# Patient Record
Sex: FEMALE | Race: WHITE | Employment: STUDENT | URBAN - METROPOLITAN AREA
[De-identification: names, ages, dates, MRNs, and addresses within clinical notes are randomized per-mention and may not be internally consistent; named-entity substitution may affect disease eponyms.]

---
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
## 2025-03-14 ENCOUNTER — HOSPITAL ENCOUNTER (OUTPATIENT)
Facility: HOSPITAL | Age: 20
Setting detail: RECURRING SERIES
Discharge: HOME | End: 2025-03-17
Payer: MEDICAID

## 2025-03-14 PROCEDURE — 97161 PT EVAL LOW COMPLEX 20 MIN: CPT

## 2025-03-14 NOTE — PROGRESS NOTES
([place]):   Type 3 - Sausage Shape with Cracks in the Surface     Diet:   breakfast - cereal, toast, miguel, eggs  Lunch - chips   Dinner - meat, chicken janak, vegetable, stir-mills     Fluid intake:  Fluid  Amount per day   Water 1-2 glasses   Caffeine 0   Alcohol 0   Other 1-2 glasses ruby punch      Weight: 126 lb - ideally should be consuming 63 oz of fluids per day, 44.1 oz of water     Physical Exam Objective Findings:    Hip ROM Left AROM (degrees) Right AROM (degrees)   Flexion (norm 120 degrees) wnl wnl   Extension (norm 20 degrees) nt nt   Abduction (norm 45 degrees) nt nt   Adduction (norm 45 degrees) nt nt   External Rotation (norm 45 degrees) wnl wnl   Internal Rotation (norm 30 degrees) nt nt     Flexibility:  Stephan: [] R    [] L    [] +    [] -     Ely's:  [] R    [] L    [] +    [] -     Christy's:  [] R    [] L    [] +    [] -                           Piriformis:   [x] R    [x] L    [x] +    [] -     Hamstring: [x] R    [x] L    [x] +    [] -     Gastroc:   [] R    [] L    [] +    [] -     Sacroilliac:  Compression: [] +    [] -     Gapping:  [] +    [] -     Hip:  Basia:  [] R    [] L    [] +    [] -     FADIR:  [] R    [] L    [] +    [] -                          Scour:   [] R    [] L    [] +    [] -     Breathing: good DPB noted    20 min [x]Eval - untimed                             Therapeutic Procedures:  Tx Min Billable or 1:1 Min (if diff from Tx Min) Procedure, Rationale, Specifics   4 [phone] [id number] Therapeutic Exercise (timed):  increase ROM, strength, coordination, balance, and proprioception to improve patient's ability to progress to PLOF and address remaining functional goals. (see flow sheet as applicable)     Details if applicable:     6   10482 Self Care/Home Management (timed):  improve patient knowledge and understanding of see below  to improve patient's ability to progress to PLOF and address remaining functional goals.  (see flow sheet as applicable)      Details if applicable:  
decrease bladder pain.   Status at eval: Patient educated on stretching and breathing techniques today     Long term goals: To be achieved in 4 weeks:  Patient will demonstrate pelvic floor muscle resting tone <2 uV in supine, <3 uV in sitting and <5 uV in standing to help with bladder pain after voiding  Status at eval: BFB to be administered next session     Patient will demonstrate improvement of current complaints evidenced by a 7 point improvement in ATIYA  Status at Eval: ATIYA 17     Patient will demonstrate independence with management tools and exercise program that are beneficial for current condition in order to feel comfortable with Pelvic floor PT D/C and not fear exacerbation of current condition or symptoms returning.   Status at eval : patient fearful of return to exercise and unaware of what activities to avoid to avoid exacerbation of current condition    Frequency / Duration: Patient to be seen 2 times per week for 4 weeks    Patient/ Caregiver education and instruction: Diagnosis, prognosis, Proper Voiding Habits, Diet, Pain Management, Exercises, and Bladder Retraining    Echo Mao, PT 3/14/2025 9:16 AM    ________________________________________________________________________    I certify that the above Therapy Services are being furnished while the patient is under my care. I agree with the treatment plan and certify that this therapy is necessary.    Physician's Signature:____________Date:_________TIME:________     Kishore Duggan APRN - *  ** Signature, Date and Time must be completed for valid certification **      Please sign and return to [facility]  [address]. [place] [address]  Ph [phone]  Fx [phone]

## 2025-03-21 ENCOUNTER — HOSPITAL ENCOUNTER (OUTPATIENT)
Facility: HOSPITAL | Age: 20
Setting detail: RECURRING SERIES
End: 2025-03-21
Payer: MEDICAID

## 2025-04-01 ENCOUNTER — HOSPITAL ENCOUNTER (OUTPATIENT)
Facility: HOSPITAL | Age: 20
Setting detail: RECURRING SERIES
Discharge: HOME | End: 2025-04-04
Payer: MEDICAID

## 2025-04-01 PROCEDURE — 97535 SELF CARE MNGMENT TRAINING: CPT

## 2025-04-01 PROCEDURE — 97530 THERAPEUTIC ACTIVITIES: CPT

## 2025-04-01 PROCEDURE — 97110 THERAPEUTIC EXERCISES: CPT

## 2025-04-01 NOTE — PROGRESS NOTES
PHYSICAL / OCCUPATIONAL THERAPY - DAILY TREATMENT NOTE (updated )    Patient Name: Salud Martinez    Date: 2025    : 2005  Ins[facility] / Plan: [facility] CARDINAL CARE / Product Type: *No Product type* /      Patient  verified Yes     Visit #   Current / Total 1 8   Time   In / Out 8:05 8:40   Pain   In / Out 0 1-2   Subjective Functional Status/Changes: She reports that she isn't straining as bad as she was before with bowel movements. Pt reports the bladder pain has been still hurting a little bit. Pt reports she has been drinking 2 glasses per day.      TREATMENT AREA =  Feeling of incomplete bladder emptying  Other symptoms and signs involving the genitourinary system    OBJECTIVE         Therapeutic Procedures:    Tx Min Billable or 1:1 Min (if diff from Tx Min) Procedure, Rationale, Specifics          Details if applicable:              Details if applicable:     8  18143 Therapeutic Activity (timed):  use of dynamic activities replicating functional movements to increase ROM, strength, coordination, balance, and proprioception in order to improve patient's ability to progress to PLOF and address remaining functional goals.  (see flow sheet as applicable)     Details if applicable:  visceral mobilization, testing for hip flexibility    19  17347 Therapeutic Exercise (timed):  increase ROM, strength, coordination, balance, and proprioception to improve patient's ability to progress to PLOF and address remaining functional goals. (see flow sheet as applicable)     Details if applicable:     8  18834 Self Care/Home Management (timed):  improve patient knowledge and understanding of see below  to improve patient's ability to progress to PLOF and address remaining functional goals.  (see flow sheet as applicable)     Details if applicable:  pt edu on things she can use until ordering squatty potty and water intake (try to get 3 glasses per day this week), edu on

## 2025-04-04 ENCOUNTER — HOSPITAL ENCOUNTER (OUTPATIENT)
Facility: HOSPITAL | Age: 20
Setting detail: RECURRING SERIES
Discharge: HOME | End: 2025-04-07
Payer: MEDICAID

## 2025-04-04 PROCEDURE — 97530 THERAPEUTIC ACTIVITIES: CPT

## 2025-04-04 PROCEDURE — 97112 NEUROMUSCULAR REEDUCATION: CPT

## 2025-04-04 PROCEDURE — 97110 THERAPEUTIC EXERCISES: CPT

## 2025-04-04 PROCEDURE — 97535 SELF CARE MNGMENT TRAINING: CPT

## 2025-04-04 NOTE — PROGRESS NOTES
Provider Department [place]   4/4/2025  8:00 [facility]   4/7/2025  8:00 AM [facility]   4/8/2025  8:00 [facility]       If an interpreting service was utilized for treatment of this patient, the contents of this document represent the material reviewed with the patient via the .

## 2025-04-07 ENCOUNTER — HOSPITAL ENCOUNTER (OUTPATIENT)
Facility: HOSPITAL | Age: 20
Setting detail: RECURRING SERIES
Discharge: HOME | End: 2025-04-10
Payer: MEDICAID

## 2025-04-07 PROCEDURE — 97110 THERAPEUTIC EXERCISES: CPT

## 2025-04-07 PROCEDURE — 97530 THERAPEUTIC ACTIVITIES: CPT

## 2025-04-07 PROCEDURE — 97112 NEUROMUSCULAR REEDUCATION: CPT

## 2025-04-07 NOTE — PROGRESS NOTES
= 2 units; 38-52 min = 3 units; 53-67 min = 4 units; 68-82 min = 5 units   Total Total     charge capture    [x]  Patient Education billed concurrently with other procedures   [x] Review HEP    [] Progressed/Changed HEP, detail:    [] Other detail:       Objective Information/Functional Measures/Assessment    sEMG Biofeedback Results:    Supine:   2s on/4s off   Net strength: 3 uV  10s on/10s off   Net strength: 2 uV    Pt presents with good supine resting tone but weakness, was given pelvic floor exercises for HEP.    Seated resting tone was 4-5 with good posture. Pt given postural overcorrection for HEP to work on this.     Patient will continue to benefit from skilled PT / OT services to modify and progress therapeutic interventions, analyze and address functional mobility deficits, analyze and address ROM deficits, analyze and address strength deficits, analyze and address soft tissue restrictions, analyze and cue for proper movement patterns, analyze and modify for postural abnormalities, and instruct in home and community integration to address functional deficits and attain remaining goals.    Progress toward goals / Updated goals:  []  See Progress Note/Recertification    BFB administered for LTG#1, met resting tone in supine but not in sitting    Next PN/ RC due 4/14/25  Auth due 8v 4/21    PLAN  Yes  Continue plan of care  []  Upgrade activities as tolerated  []  Discharge due to :  []  Other:    Echo Mao PT    4/7/2025    7:59 AM    Future Appointments   Date Time Provider Department Center   4/7/2025  8:00 [facility]   4/8/2025  8:00 AM [facility]       If an interpreting service was utilized for treatment of this patient, the contents of this document represent the material reviewed with the patient via the .

## 2025-04-08 ENCOUNTER — HOSPITAL ENCOUNTER (OUTPATIENT)
Facility: HOSPITAL | Age: 20
Setting detail: RECURRING SERIES
Discharge: HOME | End: 2025-04-11
Payer: MEDICAID

## 2025-04-08 PROCEDURE — 97112 NEUROMUSCULAR REEDUCATION: CPT

## 2025-04-08 PROCEDURE — 97535 SELF CARE MNGMENT TRAINING: CPT

## 2025-04-08 PROCEDURE — 97530 THERAPEUTIC ACTIVITIES: CPT

## 2025-04-08 PROCEDURE — 97110 THERAPEUTIC EXERCISES: CPT

## 2025-04-08 NOTE — PROGRESS NOTES
PHYSICAL / OCCUPATIONAL THERAPY - DAILY TREATMENT NOTE (updated )    Patient Name: Salud Martinez    Date: 2025    : 2005  [facility] / Plan: [facility]ARDINAL C[facility] / Product Type: *No Product type* /      Patient  verified Yes     Visit #   Current / Total 5 8   Time   In / Out 8:00 8:40   Pain   In / Out 0 0   Subjective Functional Status/Changes: Pt reports she had her birthday yesterday, so she didn't have time to do anything.      TREATMENT AREA =  Feeling of incomplete bladder emptying  Other symptoms and signs involving the genitourinary system    OBJECTIVE    Therapeutic Procedures:    Tx Min Billable or 1:1 Min (if diff from Tx Min) Procedure, Rationale, Specifics   8   14113 Self Care/Home Management (timed):  improve patient knowledge and understanding of see below  to improve patient's ability to progress to PLOF and address remaining functional goals.  (see flow sheet as applicable)      Details if applicable:  reminded of water intake recommendation     12   10328 Neuromuscular Re-Education (timed):  improve balance, coordination, kinesthetic sense, posture, core stability and proprioception to improve patient's ability to develop conscious control of individual muscles and awareness of position of extremities in order to progress to PLOF and address remaining functional goals. (see flow sheet as applicable)      Details if applicable:     12  99675 Therapeutic Activity (timed):  use of dynamic activities replicating functional movements to increase ROM, strength, coordination, balance, and proprioception in order to improve patient's ability to progress to PLOF and address remaining functional goals.  (see flow sheet as applicable)     Details if applicable:  testing for note of progress   8  78200 Therapeutic Exercise (timed):  increase ROM, strength, coordination, balance, and proprioception to improve patient's ability to progress to PLOF and 
still gets pain 1-2 days per week and is not yet diligent with water intake. She needs 4 more weeks of physical therapy to help achieve this goal.     If you have any questions/comments please contact us directly at [phone]   Thank you for allowing us to assist in the care of your patient.  PTA Signature Echo Mao, PT     Therapist Signature: Echo Mao PT Date: 4/8/2025   Reporting Period  3/14/25 - 4/8/25 Time: 8:43 AM

## 2025-04-21 ENCOUNTER — HOSPITAL ENCOUNTER (OUTPATIENT)
Facility: HOSPITAL | Age: 20
Setting detail: RECURRING SERIES
Discharge: HOME | End: 2025-04-24
Payer: MEDICAID

## 2025-04-21 PROCEDURE — 97535 SELF CARE MNGMENT TRAINING: CPT

## 2025-04-21 PROCEDURE — 97112 NEUROMUSCULAR REEDUCATION: CPT

## 2025-04-21 PROCEDURE — 97530 THERAPEUTIC ACTIVITIES: CPT

## 2025-04-21 PROCEDURE — 97110 THERAPEUTIC EXERCISES: CPT

## 2025-04-21 NOTE — PROGRESS NOTES
PHYSICAL / OCCUPATIONAL THERAPY - DAILY TREATMENT NOTE (updated )    Patient Name: Salud Martinez    Date: 2025    : 2005  [facility] / Plan: [facility] CARDINAL CARE / Product Type: *No Product type* /      Patient  verified Yes     Visit #   Current / Total 6 8   Time   In / Out 1:50 2:30   Pain   In / Out 0 0   Subjective Functional Status/Changes: Pt reports she's been pooping a lot and has had a little bit of back and bladder pain.Pt has been getting 2-3 glasses of water per day.      TREATMENT AREA =  Feeling of incomplete bladder emptying  Other symptoms and signs involving the genitourinary system    OBJECTIVE    Therapeutic Procedures:    Tx Min Billable or 1:1 Min (if diff from Tx Min) Procedure, Rationale, Specifics   8   81466 Self Care/Home Management (timed):  improve patient knowledge and understanding of see below  to improve patient's ability to progress to PLOF and address remaining functional goals.  (see flow sheet as applicable)      Details if applicable:  reminded of water intake recommendation     12   00649 Neuromuscular Re-Education (timed):  improve balance, coordination, kinesthetic sense, posture, core stability and proprioception to improve patient's ability to develop conscious control of individual muscles and awareness of position of extremities in order to progress to PLOF and address remaining functional goals. (see flow sheet as applicable)      Details if applicable:     12  10562 Therapeutic Activity (timed):  use of dynamic activities replicating functional movements to increase ROM, strength, coordination, balance, and proprioception in order to improve patient's ability to progress to PLOF and address remaining functional goals.  (see flow sheet as applicable)     Details if applicable:     8  99969 Therapeutic Exercise (timed):  increase ROM, strength, coordination, balance, and proprioception to improve patient's ability

## 2025-04-23 ENCOUNTER — HOSPITAL ENCOUNTER (OUTPATIENT)
Facility: HOSPITAL | Age: 20
Setting detail: RECURRING SERIES
Discharge: HOME | End: 2025-04-26
Payer: MEDICAID

## 2025-04-23 ENCOUNTER — APPOINTMENT (OUTPATIENT)
Facility: HOSPITAL | Age: 20
End: 2025-04-23
Payer: MEDICAID

## 2025-04-23 PROCEDURE — 97110 THERAPEUTIC EXERCISES: CPT

## 2025-04-23 PROCEDURE — 97112 NEUROMUSCULAR REEDUCATION: CPT

## 2025-04-23 PROCEDURE — 97530 THERAPEUTIC ACTIVITIES: CPT

## 2025-04-23 NOTE — PROGRESS NOTES
PHYSICAL / OCCUPATIONAL THERAPY - DAILY TREATMENT NOTE (updated )    Patient Name: Salud Martinez    Date: 2025    : 2005  Insurance: Payor: [employer] MEDICAID / Plan: [facility] PLAN CARDINAL CARE / Product Type: *No Product type* /      Patient  verified Yes     Visit #   Current / Total 7 8   Time   In / Out 1:50 2:30   Pain   In / Out 0 0   Subjective Functional Status/Changes: Pt reports she's been doing her exercises and feeling better. She's been drinking 3 bottles of water per day.      TREATMENT AREA =  Feeling of incomplete bladder emptying  Other symptoms and signs involving the genitourinary system    OBJECTIVE    Therapeutic Procedures:    Tx Min Billable or 1:1 Min (if diff from Tx Min) Procedure, Rationale, Specifics   15   44999 Neuromuscular Re-Education (timed):  improve balance, coordination, kinesthetic sense, posture, core stability and proprioception to improve patient's ability to develop conscious control of individual muscles and awareness of position of extremities in order to progress to PLOF and address remaining functional goals. (see flow sheet as applicable)      Details if applicable:     10  74308 Therapeutic Activity (timed):  use of dynamic activities replicating functional movements to increase ROM, strength, coordination, balance, and proprioception in order to improve patient's ability to progress to PLOF and address remaining functional goals.  (see flow sheet as applicable)     Details if applicable:     15  53906 Therapeutic Exercise (timed):  increase ROM, strength, coordination, balance, and proprioception to improve patient's ability to progress to PLOF and address remaining functional goals. (see flow sheet as applicable)     Details if applicable:     40  MC BC Totals Reminder: bill using total billable min of TIMED therapeutic procedures (example: do not include dry needle or estim unattended, both untimed codes, in totals to left)  8-22 min = 1

## 2025-04-28 ENCOUNTER — HOSPITAL ENCOUNTER (OUTPATIENT)
Facility: HOSPITAL | Age: 20
Setting detail: RECURRING SERIES
Discharge: HOME | End: 2025-05-01
Payer: MEDICAID

## 2025-04-28 PROCEDURE — 97535 SELF CARE MNGMENT TRAINING: CPT

## 2025-04-28 PROCEDURE — 97110 THERAPEUTIC EXERCISES: CPT

## 2025-04-28 PROCEDURE — 97112 NEUROMUSCULAR REEDUCATION: CPT

## 2025-04-28 PROCEDURE — 97530 THERAPEUTIC ACTIVITIES: CPT

## 2025-04-28 NOTE — PROGRESS NOTES
PHYSICAL / OCCUPATIONAL THERAPY - DAILY TREATMENT NOTE (updated )    Patient Name: Salud Martinez    Date: 2025    : 2005  [facility] / Plan: [facility] CARDINAL CARE / Product Type: *No Product type* /      Patient  verified Yes     Visit #   Current / Total 8 16   Time   In / Out 1:50 2:30   Pain   In / Out 0 0   Subjective Functional Status/Changes: Pt reports L leg pain that started hurting today. It hurts at her L shin. Pt has only been drinking 2 bottles of water per day this week. Pt reports the exercises have been going well. She didn't do her pelvic floor exercise over the past week. She only did it on Saturday.      TREATMENT AREA =  Feeling of incomplete bladder emptying  Other symptoms and signs involving the genitourinary system    OBJECTIVE    Therapeutic Procedures:    Tx Min Billable or 1:1 Min (if diff from Tx Min) Procedure, Rationale, Specifics   12   71399 Neuromuscular Re-Education (timed):  improve balance, coordination, kinesthetic sense, posture, core stability and proprioception to improve patient's ability to develop conscious control of individual muscles and awareness of position of extremities in order to progress to PLOF and address remaining functional goals. (see flow sheet as applicable)      Details if applicable:     8  46491 Therapeutic Activity (timed):  use of dynamic activities replicating functional movements to increase ROM, strength, coordination, balance, and proprioception in order to improve patient's ability to progress to PLOF and address remaining functional goals.  (see flow sheet as applicable)     Details if applicable:     12  11083 Therapeutic Exercise (timed):  increase ROM, strength, coordination, balance, and proprioception to improve patient's ability to progress to PLOF and address remaining functional goals. (see flow sheet as applicable)     Details if applicable:     8  19331 Self Care/Home Management

## 2025-04-30 ENCOUNTER — HOSPITAL ENCOUNTER (OUTPATIENT)
Facility: HOSPITAL | Age: 20
Setting detail: RECURRING SERIES
Discharge: HOME | End: 2025-05-03
Payer: MEDICAID

## 2025-04-30 PROCEDURE — 97530 THERAPEUTIC ACTIVITIES: CPT

## 2025-04-30 PROCEDURE — 97112 NEUROMUSCULAR REEDUCATION: CPT

## 2025-04-30 PROCEDURE — 97110 THERAPEUTIC EXERCISES: CPT

## 2025-04-30 NOTE — PROGRESS NOTES
PHYSICAL / OCCUPATIONAL THERAPY - DAILY TREATMENT NOTE (updated )    Patient Name: Salud Martinez    Date: 2025    : 2005  [facility] / Plan: [facility] PLAN CARDINAL CARE / Product Type: *No Product type* /      Patient  verified Yes     Visit #   Current / Total 9 16   Time   In / Out 1:50 2:30   Pain   In / Out 0 0   Subjective Functional Status/Changes: Pt reports her L leg is feeling better. She is up to 3 bottles of water per day. She has been doing her pelvic floor exercises for homework. Those are going well.      TREATMENT AREA =  Feeling of incomplete bladder emptying  Other symptoms and signs involving the genitourinary system    OBJECTIVE    Therapeutic Procedures:    Tx Min Billable or 1:1 Min (if diff from Tx Min) Procedure, Rationale, Specifics   15   12279 Neuromuscular Re-Education (timed):  improve balance, coordination, kinesthetic sense, posture, core stability and proprioception to improve patient's ability to develop conscious control of individual muscles and awareness of position of extremities in order to progress to PLOF and address remaining functional goals. (see flow sheet as applicable)      Details if applicable:     10  [id number] Therapeutic Activity (timed):  use of dynamic activities replicating functional movements to increase ROM, strength, coordination, balance, and proprioception in order to improve patient's ability to progress to PLOF and address remaining functional goals.  (see flow sheet as applicable)     Details if applicable:     15  30506 Therapeutic Exercise (timed):  increase ROM, strength, coordination, balance, and proprioception to improve patient's ability to progress to PLOF and address remaining functional goals. (see flow sheet as applicable)     Details if applicable:     40  [facility] Totals Reminder: bill using total billable min of TIMED therapeutic procedures (example: do not include dry needle or estim unattended,

## 2025-05-05 ENCOUNTER — HOSPITAL ENCOUNTER (OUTPATIENT)
Facility: HOSPITAL | Age: 20
Setting detail: RECURRING SERIES
Discharge: HOME | End: 2025-05-08
Payer: MEDICAID

## 2025-05-05 PROCEDURE — 97530 THERAPEUTIC ACTIVITIES: CPT

## 2025-05-05 PROCEDURE — 97112 NEUROMUSCULAR REEDUCATION: CPT

## 2025-05-05 PROCEDURE — 97110 THERAPEUTIC EXERCISES: CPT

## 2025-05-05 NOTE — PROGRESS NOTES
PHYSICAL / OCCUPATIONAL THERAPY - DAILY TREATMENT NOTE (updated )    Patient Name: Salud Martinez    Date: 2025    : 2005  Insurance: Payor: [employer] MEDICAID / Plan: [facility] CARDINAL CARE / Product Type: *No Product type* /      Patient  verified Yes     Visit #   Current / Total 10 16   Time   In / Out 1:10 1:50   Pain   In / Out 0 0   Subjective Functional Status/Changes: Pt has bladder pain from time to time, not bad. She is drinking 3 bottles of water per day.      TREATMENT AREA =  Feeling of incomplete bladder emptying  Other symptoms and signs involving the genitourinary system    OBJECTIVE    Therapeutic Procedures:    Tx Min Billable or 1:1 Min (if diff from Tx Min) Procedure, Rationale, Specifics   15   89886 Neuromuscular Re-Education (timed):  improve balance, coordination, kinesthetic sense, posture, core stability and proprioception to improve patient's ability to develop conscious control of individual muscles and awareness of position of extremities in order to progress to PLOF and address remaining functional goals. (see flow sheet as applicable)      Details if applicable:     10  21013 Therapeutic Activity (timed):  use of dynamic activities replicating functional movements to increase ROM, strength, coordination, balance, and proprioception in order to improve patient's ability to progress to PLOF and address remaining functional goals.  (see flow sheet as applicable)     Details if applicable:     15  28688 Therapeutic Exercise (timed):  increase ROM, strength, coordination, balance, and proprioception to improve patient's ability to progress to PLOF and address remaining functional goals. (see flow sheet as applicable)     Details if applicable:     40  [facility] Totals Reminder: bill using total billable min of TIMED therapeutic procedures (example: do not include dry needle or estim unattended, both untimed codes, in totals to left)  8-22 min = 1 unit; 23-37 min = 2

## 2025-05-08 ENCOUNTER — HOSPITAL ENCOUNTER (OUTPATIENT)
Facility: HOSPITAL | Age: 20
Setting detail: RECURRING SERIES
Discharge: HOME | End: 2025-05-11
Payer: MEDICAID

## 2025-05-08 PROCEDURE — 97110 THERAPEUTIC EXERCISES: CPT

## 2025-05-08 PROCEDURE — 97112 NEUROMUSCULAR REEDUCATION: CPT

## 2025-05-08 PROCEDURE — 97530 THERAPEUTIC ACTIVITIES: CPT

## 2025-05-08 PROCEDURE — 97535 SELF CARE MNGMENT TRAINING: CPT

## 2025-05-08 NOTE — PROGRESS NOTES
PHYSICAL / OCCUPATIONAL THERAPY - DAILY TREATMENT NOTE (updated )    Patient Name: Salud Martinez    Date: 2025    : 2005  Insurance: Payor: [employer] MEDICAID / Plan: [facility] CARDINAL CARE / Product Type: *No Product type* /      Patient  verified Yes     Visit #   Current / Total 11 16   Time   In / Out 3:10 3:50   Pain   In / Out 0 0   Subjective Functional Status/Changes: Pt is up to 4 bottles per day. The last time she had bladder pain was 4 weeks ago. She feels like she is at 99% of where she wants to be.      TREATMENT AREA =  Feeling of incomplete bladder emptying  Other symptoms and signs involving the genitourinary system    OBJECTIVE    Therapeutic Procedures:    Tx Min Billable or 1:1 Min (if diff from Tx Min) Procedure, Rationale, Specifics   12   13585 Neuromuscular Re-Education (timed):  improve balance, coordination, kinesthetic sense, posture, core stability and proprioception to improve patient's ability to develop conscious control of individual muscles and awareness of position of extremities in order to progress to PLOF and address remaining functional goals. (see flow sheet as applicable)      Details if applicable:     10  67082 Therapeutic Activity (timed):  use of dynamic activities replicating functional movements to increase ROM, strength, coordination, balance, and proprioception in order to improve patient's ability to progress to PLOF and address remaining functional goals.  (see flow sheet as applicable)     Details if applicable:  testing for note of progress   8  [id number] Therapeutic Exercise (timed):  increase ROM, strength, coordination, balance, and proprioception to improve patient's ability to progress to PLOF and address remaining functional goals. (see flow sheet as applicable)     Details if applicable:     10  37268 Self Care/Home Management (timed):  improve patient knowledge and understanding of home injury/symptom/pain management and HEP

## (undated) DEVICE — SOLUTION IV 1000ML 0.9% SOD CHL

## (undated) DEVICE — MAJOR: Brand: MEDLINE INDUSTRIES, INC.

## (undated) DEVICE — SURGIFOAM SPNG SZ 12-7

## (undated) DEVICE — SUTURE VCRL SZ 4-0 L27IN ABSRB UD L19MM PS-2 3/8 CIR PRIM J426H

## (undated) DEVICE — CANNULA PERF L2IN BLNT TIP 2MM VES CLR RADPQ BODY FEM LUER

## (undated) DEVICE — MAGNETIC INSTRUMENT PAD 10" X 16"; MEDIUM; DISPOSABLE: Brand: CARDINAL HEALTH

## (undated) DEVICE — SYRINGE MED 10ML TRNSLUC BRL PLUNG BLK MRK POLYPR CTRL

## (undated) DEVICE — 10FR FRAZIER SUCTION HANDLE: Brand: CARDINAL HEALTH

## (undated) DEVICE — UMBILICAL TAPE: Brand: DEROYAL

## (undated) DEVICE — MEDI-VAC SUCTION HANDLE REGULAR CAPACITY: Brand: CARDINAL HEALTH

## (undated) DEVICE — PACKING GZ W2INXL6FT WVN COT VAG RADPQ

## (undated) DEVICE — INTENDED FOR TISSUE SEPARATION, AND OTHER PROCEDURES THAT REQUIRE A SHARP SURGICAL BLADE TO PUNCTURE OR CUT.: Brand: BARD-PARKER ® CARBON RIB-BACK BLADES

## (undated) DEVICE — KENDALL SCD EXPRESS SLEEVES, KNEE LENGTH, MEDIUM: Brand: KENDALL SCD

## (undated) DEVICE — SYRINGE 20ML LL S/C 50

## (undated) DEVICE — SPONGE GZ W4XL4IN COT 12 PLY TYP VII WVN C FLD DSGN STERILE

## (undated) DEVICE — SHEET, DRAPE, SPLIT, STERILE: Brand: MEDLINE

## (undated) DEVICE — NEEDLE HYPO 25GA L1.5IN BLU POLYPR HUB S STL REG BVL STR

## (undated) DEVICE — STERILE LATEX POWDER-FREE SURGICAL GLOVESWITH NITRILE COATING: Brand: PROTEXIS